# Patient Record
Sex: MALE | URBAN - METROPOLITAN AREA
[De-identification: names, ages, dates, MRNs, and addresses within clinical notes are randomized per-mention and may not be internally consistent; named-entity substitution may affect disease eponyms.]

---

## 2022-09-04 ENCOUNTER — EMERGENCY (EMERGENCY)
Facility: HOSPITAL | Age: 57
LOS: 1 days | Discharge: HOME | End: 2022-09-04
Admitting: EMERGENCY MEDICINE

## 2022-09-04 VITALS
HEIGHT: 72 IN | TEMPERATURE: 98 F | HEART RATE: 76 BPM | SYSTOLIC BLOOD PRESSURE: 125 MMHG | RESPIRATION RATE: 16 BRPM | OXYGEN SATURATION: 94 % | DIASTOLIC BLOOD PRESSURE: 83 MMHG | WEIGHT: 225.09 LBS

## 2022-09-04 PROCEDURE — 99283 EMERGENCY DEPT VISIT LOW MDM: CPT

## 2022-09-04 NOTE — ED ADULT NURSE NOTE - CHIEF COMPLAINT QUOTE
BIBA from [facility] train station for alcohol intoxication. admits to drinking beer.

## 2022-09-04 NOTE — ED ADULT NURSE NOTE - NSIMPLEMENTINTERV_GEN_ALL_ED
DISPLAY PLAN FREE TEXT Implemented All Fall Risk Interventions:  [employer] to call system. Call bell, personal items and telephone within reach. Instruct patient to call for assistance. Room bathroom lighting operational. Non-slip footwear when patient is off stretcher. Physically safe environment: no spills, clutter or unnecessary equipment. Stretcher in lowest position, wheels locked, appropriate side rails in place. Provide visual cue, wrist band, yellow gown, etc. Monitor gait and stability. Monitor for mental status changes and reorient to person, place, and time. Review medications for side effects contributing to fall risk. Reinforce activity limits and safety measures with patient and family.

## 2022-09-04 NOTE — ED PROVIDER NOTE - PATIENT PORTAL LINK FT
You can access the FollowMyHealth Patient Portal offered by [facility] by registering at the following website: [email or web address]. By joining [employer]’s FollowMyHealth portal, you will also be able to view your health information using other applications (apps) compatible with our system.

## 2022-09-04 NOTE — ED ADULT TRIAGE NOTE - CHIEF COMPLAINT QUOTE
BIBA from [facility] train station for alcohol intoxication. admits to drinking beer.

## 2022-09-04 NOTE — ED PROVIDER NOTE - OBJECTIVE STATEMENT
56 yo m bibems for AMS, possibly 2/2 substance use, no trauma reported.  limited history 2/2 mental status.    I have reviewed available current nursing and previous documentation of past medical, surgical, family, and/or social history.

## 2022-09-04 NOTE — ED PROVIDER NOTE - IV ALTEPLASE EXCL REL HIDDEN
"Rich Bailey,  Lipid tests including total cholesterol, triglycerides, HDL (\"good cholesterol\") and LDL (\"bad cholesterol\") are normal.    The blood chemistries (Basic metabolic panel) are all normal including electrolytes (salt balances in the blood) and kidney tests with the exception of glucose which is borderline.   PSA test (for prostate cancer screening) is normal.   Your blood counts including the white blood cells, red blood cells and platelets are all normal.  .    All your tests look good.   . No new changes in treatment recommended.      Ayden  " show
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
## 2022-09-08 DIAGNOSIS — S09.90XA UNSPECIFIED INJURY OF HEAD, INITIAL ENCOUNTER: ICD-10-CM
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
## 2022-09-08 DIAGNOSIS — S01.01XA LACERATION WITHOUT FOREIGN BODY OF SCALP, INITIAL ENCOUNTER: ICD-10-CM

## 2022-09-08 DIAGNOSIS — R41.82 ALTERED MENTAL STATUS, UNSPECIFIED: ICD-10-CM
